# Patient Record
Sex: FEMALE | Race: WHITE
[De-identification: names, ages, dates, MRNs, and addresses within clinical notes are randomized per-mention and may not be internally consistent; named-entity substitution may affect disease eponyms.]

---

## 2021-10-28 ENCOUNTER — NURSE TRIAGE (OUTPATIENT)
Dept: OTHER | Facility: CLINIC | Age: 76
End: 2021-10-28

## 2021-10-28 NOTE — TELEPHONE ENCOUNTER
Reason for Disposition   Message left on unidentified voice mail. Phone number verified. Protocols used: NO CONTACT OR DUPLICATE CONTACT CALL-ADULT-[place]    Received call from [address] [place] [address]. Pt had disconnected. Pt was unistablished. This RN called at verified number of [phone]. Msg left on unidentified VM to call back if further assistance needed.